# Patient Record
Sex: MALE | Employment: UNEMPLOYED | URBAN - METROPOLITAN AREA
[De-identification: names, ages, dates, MRNs, and addresses within clinical notes are randomized per-mention and may not be internally consistent; named-entity substitution may affect disease eponyms.]

---

## 2018-04-22 ENCOUNTER — HOSPITAL ENCOUNTER (EMERGENCY)
Age: 23
Discharge: HOME | End: 2018-04-22
Attending: EMERGENCY MEDICINE
Payer: SELF-PAY

## 2018-04-22 VITALS
SYSTOLIC BLOOD PRESSURE: 128 MMHG | RESPIRATION RATE: 18 BRPM | HEIGHT: 67 IN | BODY MASS INDEX: 23.54 KG/M2 | OXYGEN SATURATION: 100 % | DIASTOLIC BLOOD PRESSURE: 80 MMHG | HEART RATE: 76 BPM | WEIGHT: 150 LBS | TEMPERATURE: 98.2 F

## 2018-04-22 DIAGNOSIS — R07.0 THROAT PAIN IN ADULT: Primary | ICD-10-CM

## 2018-04-22 PROCEDURE — 74011000250 HC RX REV CODE- 250: Performed by: EMERGENCY MEDICINE

## 2018-04-22 PROCEDURE — 74011250637 HC RX REV CODE- 250/637: Performed by: EMERGENCY MEDICINE

## 2018-04-22 PROCEDURE — 99283 EMERGENCY DEPT VISIT LOW MDM: CPT

## 2018-04-22 RX ORDER — ACETAMINOPHEN 500 MG
1000 TABLET ORAL
Status: COMPLETED | OUTPATIENT
Start: 2018-04-22 | End: 2018-04-22

## 2018-04-22 RX ORDER — ACETAMINOPHEN 325 MG/1
650 TABLET ORAL
Qty: 20 TAB | Refills: 0 | Status: SHIPPED | OUTPATIENT
Start: 2018-04-22

## 2018-04-22 RX ORDER — LIDOCAINE HYDROCHLORIDE 20 MG/ML
5 INJECTION, SOLUTION INFILTRATION; PERINEURAL ONCE
Status: COMPLETED | OUTPATIENT
Start: 2018-04-22 | End: 2018-04-22

## 2018-04-22 RX ADMIN — LIDOCAINE HYDROCHLORIDE 100 MG: 20 INJECTION, SOLUTION INFILTRATION; PERINEURAL at 13:25

## 2018-04-22 RX ADMIN — ACETAMINOPHEN 1000 MG: 500 TABLET, FILM COATED ORAL at 13:25

## 2018-04-22 NOTE — ED PROVIDER NOTES
EMERGENCY DEPARTMENT HISTORY AND PHYSICAL EXAM    1:10 PM      Date: 4/22/2018  Patient Name: Tory James    History of Presenting Illness     Chief Complaint   Patient presents with    Sore Throat    Other         History Provided By: Patient    Chief Complaint: sore throat   Duration:  1 hour  Timing:  Acute  Location: right side of throat, inside the throat  Quality: Stabbing and throbbing, poking per pt  Severity: Moderate  Modifying Factors: not triggered by eating  Associated Symptoms: denies any other associated signs or symptoms      Additional History (Context): Tory James, a 26 y.o. Male, marijuana smoker (last used this morning), social alcohol drinker with no PMHx, SHx, or allergies, presents to the ED with 1 hour of acute, moderate severity, right sided sore throat that is described as a stabbing, throbbing and poking sensation by the pt. Sx were initially associated with throat swelling but pt explains that his throat swelling has resolved and is no longer a complaint at the time of evaluation. Pt does not have h/o the same and denies arm weakness, abx intake, recent travels, cp, sob, and abd pain. No other complaints are reported at this time. PCP: Angelo Caro MD    Current Outpatient Prescriptions   Medication Sig Dispense Refill    promethazine (PHENERGAN) 25 mg tablet Take 1 Tab by mouth every twelve (12) hours as needed. 12 Tab 0    fluticasone (FLONASE) 50 mcg/actuation nasal spray 2 Sprays by Both Nostrils route daily. 1 spray in each nostril. 1 Bottle 0    cetirizine (ZYRTEC) 10 mg tablet Take one tablets by mouth daily for 7 days and then as needed after that. Restart forseven days if congestion recurs. 15 Tab 0    albuterol (PROVENTIL, VENTOLIN) 90 mcg/actuation inhaler Take 1-2 Puffs by inhalation every four (4) hours as needed for Wheezing.  17 g 0       Past History     Past Medical History:  Past Medical History:   Diagnosis Date    HX OTHER MEDICAL seasonal allergies       Past Surgical History:  History reviewed. No pertinent surgical history. Family History:  History reviewed. No pertinent family history. Social History:  Social History   Substance Use Topics    Smoking status: Never Smoker    Smokeless tobacco: Never Used    Alcohol use Yes      Comment: socially       Allergies:  No Known Allergies      Review of Systems       Review of Systems   Constitutional: Negative for chills and fever. HENT: Positive for sore throat (right side of throat, \"poking\" throbbing, stabbing sensation ). Negative for congestion and ear pain. Eyes: Negative for pain and visual disturbance. Respiratory: Negative for cough and shortness of breath. Cardiovascular: Negative for chest pain and palpitations. Gastrointestinal: Negative for abdominal pain, diarrhea, nausea and vomiting. Genitourinary: Negative for flank pain. Musculoskeletal: Negative for back pain and neck pain. Neurological: Negative for syncope and headaches. Psychiatric/Behavioral: Negative for agitation. The patient is not nervous/anxious. All other systems reviewed and are negative. Physical Exam     Visit Vitals    /80 (BP 1 Location: Right arm, BP Patient Position: At rest)    Pulse 76    Temp 98.2 °F (36.8 °C)    Resp 18    Ht 5' 7\" (1.702 m)    Wt 68 kg (150 lb)    SpO2 100%    BMI 23.49 kg/m2       Physical Exam   Constitutional: He is oriented to person, place, and time. He appears well-developed and well-nourished. HENT:   Head: Normocephalic and atraumatic. Mouth/Throat: Oropharynx is clear and moist.   tonsils normal, no tenderness on neck,   uvula midline. no stridor in the neck. Eyes: Pupils are equal, round, and reactive to light. No scleral icterus. Neck: Neck supple. No tracheal deviation present. Cardiovascular: Regular rhythm. No murmur heard. Pulmonary/Chest: Effort normal and breath sounds normal. No respiratory distress. Abdominal: Soft. There is no tenderness. Musculoskeletal: Normal range of motion. He exhibits no deformity. Lymphadenopathy:     He has no cervical adenopathy. Neurological: He is alert and oriented to person, place, and time. No gross neuro deficit   Skin: Skin is warm and dry. No rash noted. He is not diaphoretic. Psychiatric: He has a normal mood and affect. Nursing note and vitals reviewed. Medical Decision Making   I am the first provider for this patient. I reviewed the vital signs, available nursing notes, past medical history, past surgical history, family history and social history. Vital Signs-Reviewed the patient's vital signs. Pulse Oximetry Analysis -  100% on room air (Interpretation) Normal     Records Reviewed: Nursing Notes and Old Medical Records (Time of Review: 1:10 PM)    ED Course: Progress Notes, Reevaluation, and Consults:    Provider Notes (Medical Decision Making):     Pt presents with irritation in throat, started today, no specific relationship to eating, no fever. Smoked marijuana this morning. PE without acute pathology, will image hypopharynx with gleidoscope, with pts permission, and nebulize lidocaine. This has been discussed with the pt    1:57 PM  Was able to evaluate the hypopharynx and area near the glottis. No foreign body was appreciated. Patient tolerated the procedure well with lidocaine 2% epiglottis. 5 mL was required to use.   hypopharynx was without edema, foreign body or erythema. Imaging of the hypopharynx showed no foreign body, no edema of epiglottis, no foreign body near the epiglottic area. Diagnosis     Clinical Impression:   1.  Throat pain in adult        Disposition: Discharged    Follow-up Information     Follow up With Details Comments [address] Call in 1 day Follow Up From Emergency Department [address]  [phone]           Patient's Medications   Start Taking    No medications on file   Continue Taking    ALBUTEROL (PROVENTIL, VENTOLIN) 90 MCG/ACTUATION INHALER    Take 1-2 Puffs by inhalation every four (4) hours as needed for Wheezing. CETIRIZINE (ZYRTEC) 10 MG TABLET    Take one tablets by mouth daily for 7 days and then as needed after that. Restart forseven days if congestion recurs. FLUTICASONE (FLONASE) 50 MCG/ACTUATION NASAL SPRAY    2 Sprays by Both Nostrils route daily. 1 spray in each nostril. PROMETHAZINE (PHENERGAN) 25 MG TABLET    Take 1 Tab by mouth every twelve (12) hours as needed. These Medications have changed    No medications on file   Stop Taking    No medications on file     _______________________________    Attestations:  Stephen Godinez acting as a scribe for and in the presence of Janice Alanis MD      April 22, 2018 at 1:10 PM       Provider Attestation:      I personally performed the services described in the documentation, reviewed the documentation, as recorded by the scribe in my presence, and it accurately and completely records my words and actions.  April 22, 2018 at 1:10 PM - Janice Alanis MD    _______________________________

## 2018-04-22 NOTE — DISCHARGE INSTRUCTIONS
Sore Throat: Care Instructions  Your Care Instructions    Infection by bacteria or a virus causes most sore throats. Cigarette smoke, dry air, air pollution, allergies, and yelling can also cause a sore throat. Sore throats can be painful and annoying. Fortunately, most sore throats go away on their own. If you have a bacterial infection, your doctor may prescribe antibiotics. Follow-up care is a key part of your treatment and safety. Be sure to make and go to all appointments, and call your doctor if you are having problems. It's also a good idea to know your test results and keep a list of the medicines you take. How can you care for yourself at home? · If your doctor prescribed antibiotics, take them as directed. Do not stop taking them just because you feel better. You need to take the full course of antibiotics. · Gargle with warm salt water once an hour to help reduce swelling and relieve discomfort. Use 1 teaspoon of salt mixed in 1 cup of warm water. · Take an over-the-counter pain medicine, such as acetaminophen (Tylenol), ibuprofen (Advil, Motrin), or naproxen (Aleve). Read and follow all instructions on the label. · Be careful when taking over-the-counter cold or flu medicines and Tylenol at the same time. Many of these medicines have acetaminophen, which is Tylenol. Read the labels to make sure that you are not taking more than the recommended dose. Too much acetaminophen (Tylenol) can be harmful. · Drink plenty of fluids. Fluids may help soothe an irritated throat. Hot fluids, such as tea or soup, may help decrease throat pain. · Use over-the-counter throat lozenges to soothe pain. Regular cough drops or hard candy may also help. These should not be given to young children because of the risk of choking. · Do not smoke or allow others to smoke around you. If you need help quitting, talk to your doctor about stop-smoking programs and medicines.  These can increase your chances of quitting for good. · Use a vaporizer or humidifier to add moisture to your bedroom. Follow the directions for cleaning the machine. When should you call for help? Call your doctor now or seek immediate medical care if:  ? · You have new or worse trouble swallowing. ? · Your sore throat gets much worse on one side. ? Watch closely for changes in your health, and be sure to contact your doctor if you do not get better as expected. Where can you learn more? Go to http://jessenia-uday.info/. Enter [phone] in the search box to learn more about \"Sore Throat: Care Instructions. \"  Current as of: May 12, 2017  Content Version: 11.4  © 3811-8387 [employer]. Care instructions adapted under license by [employer] (which disclaims liability or warranty for this information). If you have questions about a medical condition or this instruction, always ask your healthcare professional. [address] any warranty or liability for your use of this information.

## 2018-04-22 NOTE — ED TRIAGE NOTES
Pt c/o \"i feel like my throat is swelling. \" denies eating, taking medicine today, or enviromental changes \"I live with my grandmother. \"

## 2018-04-22 NOTE — ED NOTES
pt with c/o \"heartbeat in my throat\" explained anatomy to pt- pt stating that he is going to ask his PCP for a CXR \"to make sure things are all ok\" , pt without any c/o pain or difficulty swallowing, pt had used Marijuana today and feels \"heartbeat in throat\" no pain or stabbing, just \"heartbeating\".  Explained carotid arteries and flow of blood to brain and pt stating \"naw its like a heart beating in the sides of my throat\"

## 2018-06-08 ENCOUNTER — APPOINTMENT (OUTPATIENT)
Dept: CT IMAGING | Age: 23
End: 2018-06-08
Attending: EMERGENCY MEDICINE
Payer: SELF-PAY

## 2018-06-08 ENCOUNTER — APPOINTMENT (OUTPATIENT)
Dept: GENERAL RADIOLOGY | Age: 23
End: 2018-06-08
Attending: EMERGENCY MEDICINE
Payer: SELF-PAY

## 2018-06-08 ENCOUNTER — HOSPITAL ENCOUNTER (EMERGENCY)
Age: 23
Discharge: HOME | End: 2018-06-08
Attending: EMERGENCY MEDICINE
Payer: SELF-PAY

## 2018-06-08 VITALS
HEART RATE: 65 BPM | WEIGHT: 150 LBS | HEIGHT: 71 IN | DIASTOLIC BLOOD PRESSURE: 58 MMHG | SYSTOLIC BLOOD PRESSURE: 122 MMHG | RESPIRATION RATE: 18 BRPM | TEMPERATURE: 98.2 F | OXYGEN SATURATION: 100 % | BODY MASS INDEX: 21 KG/M2

## 2018-06-08 DIAGNOSIS — R68.89 SENSATION OF SWOLLEN THROAT: Primary | ICD-10-CM

## 2018-06-08 LAB
ANION GAP BLD CALC-SCNC: 16 MMOL/L (ref 10–20)
BASOPHILS # BLD: 0 K/UL (ref 0–0.06)
BASOPHILS NFR BLD: 0 % (ref 0–2)
BUN BLD-MCNC: 13 MG/DL (ref 7–18)
CA-I BLD-MCNC: 1.24 MMOL/L (ref 1.12–1.32)
CHLORIDE BLD-SCNC: 99 MMOL/L (ref 100–108)
CO2 BLD-SCNC: 26 MMOL/L (ref 19–24)
CREAT UR-MCNC: 0.8 MG/DL (ref 0.6–1.3)
DIFFERENTIAL METHOD BLD: ABNORMAL
EOSINOPHIL # BLD: 0.2 K/UL (ref 0–0.4)
EOSINOPHIL NFR BLD: 4 % (ref 0–5)
ERYTHROCYTE [DISTWIDTH] IN BLOOD BY AUTOMATED COUNT: 11.9 % (ref 11.6–14.5)
GLUCOSE BLD STRIP.AUTO-MCNC: 88 MG/DL (ref 74–106)
HCT VFR BLD AUTO: 47.9 % (ref 36–48)
HCT VFR BLD CALC: 50 % (ref 36–49)
HGB BLD-MCNC: 17 G/DL (ref 12–16)
HGB BLD-MCNC: 17.4 G/DL (ref 13–16)
LYMPHOCYTES # BLD: 2.1 K/UL (ref 0.9–3.6)
LYMPHOCYTES NFR BLD: 34 % (ref 21–52)
MCH RBC QN AUTO: 33.8 PG (ref 24–34)
MCHC RBC AUTO-ENTMCNC: 36.3 G/DL (ref 31–37)
MCV RBC AUTO: 93 FL (ref 74–97)
MONOCYTES # BLD: 0.6 K/UL (ref 0.05–1.2)
MONOCYTES NFR BLD: 10 % (ref 3–10)
NEUTS SEG # BLD: 3.3 K/UL (ref 1.8–8)
NEUTS SEG NFR BLD: 52 % (ref 40–73)
PLATELET # BLD AUTO: 155 K/UL (ref 135–420)
PMV BLD AUTO: 10.7 FL (ref 9.2–11.8)
POTASSIUM BLD-SCNC: 3.8 MMOL/L (ref 3.5–5.5)
RBC # BLD AUTO: 5.15 M/UL (ref 4.7–5.5)
SODIUM BLD-SCNC: 135 MMOL/L (ref 136–145)
WBC # BLD AUTO: 6.3 K/UL (ref 4.6–13.2)

## 2018-06-08 PROCEDURE — 99285 EMERGENCY DEPT VISIT HI MDM: CPT

## 2018-06-08 PROCEDURE — 74011636320 HC RX REV CODE- 636/320: Performed by: EMERGENCY MEDICINE

## 2018-06-08 PROCEDURE — 85025 COMPLETE CBC W/AUTO DIFF WBC: CPT | Performed by: EMERGENCY MEDICINE

## 2018-06-08 PROCEDURE — 70360 X-RAY EXAM OF NECK: CPT

## 2018-06-08 PROCEDURE — 80047 BASIC METABLC PNL IONIZED CA: CPT

## 2018-06-08 PROCEDURE — 70491 CT SOFT TISSUE NECK W/DYE: CPT

## 2018-06-08 RX ADMIN — IOPAMIDOL 90 ML: 612 INJECTION, SOLUTION INTRAVENOUS at 05:29

## 2018-06-08 NOTE — ED PROVIDER NOTES
HPI Comments: Padilla Odom is a 26 y.o. Male with c/o sensation of throat closing tonight but has been going off/on for last month. No cough, stridor, diff swallowing, fcs, facial swelling. Vomited after taking the steroid he was previously prescribed. No neck swelling. Sx worse with lying down. No syncope    The history is provided by the patient. Past Medical History:   Diagnosis Date    Asthma     HX OTHER MEDICAL seasonal allergies       History reviewed. No pertinent surgical history. History reviewed. No pertinent family history. Social History     Social History    Marital status: SINGLE     Spouse name: N/A    Number of children: N/A    Years of education: N/A     Occupational History    Not on file. Social History Main Topics    Smoking status: Former Smoker    Smokeless tobacco: Never Used    Alcohol use Yes      Comment: socially    Drug use: Yes     Special: Heroin, Marijuana    Sexual activity: Yes     Partners: Female     Other Topics Concern    Not on file     Social History Narrative         ALLERGIES: Review of patient's allergies indicates no known allergies. Review of Systems   Constitutional: Negative for fever. HENT: Negative for congestion, drooling, facial swelling, sneezing, sore throat, tinnitus, trouble swallowing and voice change. Eyes: Negative for visual disturbance. Respiratory: Negative for cough, shortness of breath and wheezing. Cardiovascular: Negative for chest pain and leg swelling. Gastrointestinal: Negative for abdominal pain. Musculoskeletal: Negative for gait problem. Skin: Negative for rash. Allergic/Immunologic: Negative for immunocompromised state. Neurological: Negative for syncope. Psychiatric/Behavioral: Positive for sleep disturbance.        Vitals:    06/08/18 0322   BP: 128/90   Pulse: 65   Resp: 20   Temp: 98.2 °F (36.8 °C)   SpO2: 100%   Weight: 68 kg (150 lb)   Height: 5' 11\" (1.803 m)            Physical Exam   Constitutional: He is oriented to person, place, and time. Non-toxic appearance. He does not appear ill. No distress. HENT:   Head: Normocephalic and atraumatic. Right Ear: External ear normal.   Left Ear: External ear normal.   Nose: Nose normal.   Mouth/Throat: Uvula is midline, oropharynx is clear and moist and mucous membranes are normal. No oral lesions. No trismus in the jaw. No dental abscesses or uvula swelling. No oropharyngeal exudate, posterior oropharyngeal edema, posterior oropharyngeal erythema or tonsillar abscesses. Eyes: Conjunctivae are normal.   Neck: Normal range of motion. Neck supple. Cardiovascular: Normal rate, regular rhythm, normal heart sounds and intact distal pulses. Pulmonary/Chest: Effort normal and breath sounds normal. No stridor. No respiratory distress. Abdominal: Soft. There is no tenderness. Musculoskeletal: Normal range of motion. He exhibits no edema. Lymphadenopathy:     He has no cervical adenopathy. Neurological: He is alert and oriented to person, place, and time. Skin: Skin is warm and dry. He is not diaphoretic. Psychiatric: His behavior is normal.   Nursing note and vitals reviewed.        [facility]      ED Course       Procedures    Vitals:  Patient Vitals for the past 12 hrs:   Temp Pulse Resp BP SpO2   06/08/18 0322 98.2 °F (36.8 °C) 65 20 128/90 100 %       Medications ordered:   Medications   iopamidol (ISOVUE 300) 61 % contrast injection 100 mL (90 mL IntraVENous Given 6/8/18 0529)         Lab findings:  Recent Results (from the past 12 hour(s))   CBC WITH AUTOMATED DIFF    Collection Time: 06/08/18  4:06 AM   Result Value Ref Range    WBC 6.3 4.6 - 13.2 K/uL    RBC 5.15 4.70 - 5.50 M/uL    HGB 17.4 (H) 13.0 - 16.0 g/dL    HCT 47.9 36.0 - 48.0 %    MCV 93.0 74.0 - 97.0 FL    MCH 33.8 24.0 - 34.0 PG    MCHC 36.3 31.0 - 37.0 g/dL    RDW 11.9 11.6 - 14.5 %    PLATELET 596 141 - 241 K/uL    MPV 10.7 9.2 - 11.8 FL    NEUTROPHILS 52 40 - 73 % LYMPHOCYTES 34 21 - 52 %    MONOCYTES 10 3 - 10 %    EOSINOPHILS 4 0 - 5 %    BASOPHILS 0 0 - 2 %    ABS. NEUTROPHILS 3.3 1.8 - 8.0 K/UL    ABS. LYMPHOCYTES 2.1 0.9 - 3.6 K/UL    ABS. MONOCYTES 0.6 0.05 - 1.2 K/UL    ABS. EOSINOPHILS 0.2 0.0 - 0.4 K/UL    ABS. BASOPHILS 0.0 0.0 - 0.06 K/UL    DF AUTOMATED     POC CHEM8    Collection Time: 06/08/18  4:56 AM   Result Value Ref Range    CO2, POC 26 (H) 19 - 24 MMOL/L    Glucose, POC 88 74 - 106 MG/DL    BUN, POC 13 7 - 18 MG/DL    Creatinine, POC 0.8 0.6 - 1.3 MG/DL    GFRAA, POC >60 >60 ml/min/1.73m2    GFRNA, POC >60 >60 ml/min/1.73m2    Sodium,  (L) 136 - 145 MMOL/L    Potassium, POC 3.8 3.5 - 5.5 MMOL/L    Calcium, ionized (POC) 1.24 1.12 - 1.32 mmol/L    Chloride, POC 99 (L) 100 - 108 MMOL/L    Anion gap, POC 16 10 - 20      Hematocrit, POC 50 (H) 36 - 49 %    Hemoglobin, POC 17.0 (H) 12 - 16 G/DL       EKG interpretation by ED Physician:      X-Ray, CT or other radiology findings or impressions:  XR NECK SOFT TISSUE    (Results Pending)   CT NECK SOFT TISSUE W CONT    (Results Pending)       Progress notes, Consult notes or additional Procedure notes:   Nothing acute on ct  Ct ordered for possible epiglottis swelling on xray per my intepr  Doubt need for other work up, ent eval, transfer tonight. No evidence of airway obstruction  I have discussed with patient and/or family/sig other the results, interpretation of any imaging if performed, suspected diagnosis and treatment plan to include instructions regarding the diagnoses listed to which understanding was expressed with all questions answered      Reevaluation of patient:   stable    Disposition:  Diagnosis:   1.  Sensation of swollen throat        Disposition: home      Follow-up Information     Follow up With Details Comments Contact Info    [facility] Department of Otolaryngology Schedule an appointment as soon as possible for a visit  [address] [place] [address]  [phone] Patient's Medications   Start Taking    No medications on file   Continue Taking    ACETAMINOPHEN (TYLENOL) 325 MG TABLET    Take 2 Tabs by mouth every four (4) hours as needed for Pain. METHYLPREDNISOLONE (MEDROL, LELA,) 4 MG TABLET    Use as directed.    These Medications have changed    No medications on file   Stop Taking    No medications on file

## 2018-06-08 NOTE — ED TRIAGE NOTES
Patient complains of throat feeling swollen. Patient states he was seen at [facility] and given a steroid which helped but then felt like it got worse. Patient is 100 percent on RA. Patient is able to swallow all secretions and is in no apparent distress at this time.

## 2019-02-28 ENCOUNTER — HOSPITAL ENCOUNTER (OUTPATIENT)
Dept: LAB | Age: 24
Discharge: HOME | End: 2019-02-28
Payer: MEDICAID

## 2019-02-28 ENCOUNTER — OFFICE VISIT (OUTPATIENT)
Dept: FAMILY MEDICINE CLINIC | Age: 24
End: 2019-02-28

## 2019-02-28 VITALS
WEIGHT: 162 LBS | RESPIRATION RATE: 16 BRPM | DIASTOLIC BLOOD PRESSURE: 81 MMHG | BODY MASS INDEX: 23.19 KG/M2 | HEART RATE: 76 BPM | TEMPERATURE: 98.6 F | HEIGHT: 70 IN | OXYGEN SATURATION: 98 % | SYSTOLIC BLOOD PRESSURE: 133 MMHG

## 2019-02-28 DIAGNOSIS — J39.2 THROAT MASS: ICD-10-CM

## 2019-02-28 DIAGNOSIS — J39.2 THROAT MASS: Primary | ICD-10-CM

## 2019-02-28 PROBLEM — J45.909 ASTHMA: Status: RESOLVED | Noted: 2019-02-28 | Resolved: 2019-02-28

## 2019-02-28 LAB
ALBUMIN SERPL-MCNC: 4.2 G/DL (ref 3.4–5)
ALBUMIN/GLOB SERPL: 1.3 {RATIO} (ref 0.8–1.7)
ALP SERPL-CCNC: 134 U/L (ref 45–117)
ALT SERPL-CCNC: 19 U/L (ref 16–61)
ANION GAP SERPL CALC-SCNC: 9 MMOL/L (ref 3–18)
AST SERPL-CCNC: 11 U/L (ref 15–37)
BILIRUB SERPL-MCNC: 0.6 MG/DL (ref 0.2–1)
BUN SERPL-MCNC: 12 MG/DL (ref 7–18)
BUN/CREAT SERPL: 14 (ref 12–20)
CALCIUM SERPL-MCNC: 8.6 MG/DL (ref 8.5–10.1)
CHLORIDE SERPL-SCNC: 106 MMOL/L (ref 100–108)
CO2 SERPL-SCNC: 25 MMOL/L (ref 21–32)
CREAT SERPL-MCNC: 0.88 MG/DL (ref 0.6–1.3)
ERYTHROCYTE [DISTWIDTH] IN BLOOD BY AUTOMATED COUNT: 12.2 % (ref 11.6–14.5)
GLOBULIN SER CALC-MCNC: 3.3 G/DL (ref 2–4)
GLUCOSE SERPL-MCNC: 85 MG/DL (ref 74–99)
HCT VFR BLD AUTO: 48.7 % (ref 36–48)
HGB BLD-MCNC: 16.7 G/DL (ref 13–16)
MCH RBC QN AUTO: 31.2 PG (ref 24–34)
MCHC RBC AUTO-ENTMCNC: 34.3 G/DL (ref 31–37)
MCV RBC AUTO: 91 FL (ref 74–97)
PLATELET # BLD AUTO: 247 K/UL (ref 135–420)
PMV BLD AUTO: 10.9 FL (ref 9.2–11.8)
POTASSIUM SERPL-SCNC: 4 MMOL/L (ref 3.5–5.5)
PROT SERPL-MCNC: 7.5 G/DL (ref 6.4–8.2)
RBC # BLD AUTO: 5.35 M/UL (ref 4.7–5.5)
SODIUM SERPL-SCNC: 140 MMOL/L (ref 136–145)
T4 FREE SERPL-MCNC: 1 NG/DL (ref 0.7–1.5)
TSH SERPL DL<=0.05 MIU/L-ACNC: 0.91 UIU/ML (ref 0.36–3.74)
WBC # BLD AUTO: 5.9 K/UL (ref 4.6–13.2)

## 2019-02-28 PROCEDURE — 86664 EPSTEIN-BARR NUCLEAR ANTIGEN: CPT

## 2019-02-28 PROCEDURE — 85027 COMPLETE CBC AUTOMATED: CPT

## 2019-02-28 PROCEDURE — 84443 ASSAY THYROID STIM HORMONE: CPT

## 2019-02-28 PROCEDURE — 84439 ASSAY OF FREE THYROXINE: CPT

## 2019-02-28 PROCEDURE — 36415 COLL VENOUS BLD VENIPUNCTURE: CPT

## 2019-02-28 PROCEDURE — 80053 COMPREHEN METABOLIC PANEL: CPT

## 2019-02-28 PROCEDURE — 86308 HETEROPHILE ANTIBODY SCREEN: CPT

## 2019-02-28 NOTE — PATIENT INSTRUCTIONS
Thyroid Hormone Tests: About This Test  What is it? Thyroid hormone tests are blood tests that check how well your thyroid gland is working. The thyroid gland is a butterfly-shaped gland that lies in front of your windpipe (trachea), just below your voice box (larynx). The thyroid gland makes hormones that control the way your body uses energy (metabolism). This test will give your doctor information about your thyroid hormone levels. You may have hyperthyroidism when your thyroid makes too much thyroid hormone. You may have hypothyroidism when your body does not make enough thyroid hormone. Why is this test done? Thyroid hormone tests are done to:  · Find the cause of an abnormal thyroid-stimulating hormone (TSH) test, which is a blood test that checks for thyroid gland problems. · Check how well treatment for thyroid disease is working. · Test a  to find out if his or her thyroid gland is working as it should. How can you prepare for the test?  Talk to your doctor about all your health conditions before the test. For example, tell your doctor about all medicines you take. If you are taking thyroid medicines, tell your doctor when you took your last dose. You may need to stop taking thyroid medicines for a short time before having this test.  What happens during the test?  Blood test  A health professional will take a sample of your blood. Heel stick  A 's heel is pricked with a sharp, short needle (lancet). Then, several drops of blood are collected for the test. Your child may have a tiny bruise where his or her heel was pricked. What happens after the test?  You can go back to your usual activities right away. When should you call for help? Watch closely for changes in your health, and be sure to contact your doctor if you have any questions about this test.  Follow-up care is a key part of your treatment and safety.  Be sure to make and go to all appointments, and call your doctor if you are having problems. It's also a good idea to keep a list of the medicines you take. Ask your doctor when you can expect to have your test results. Where can you learn more? Go to http://jessenia-uday.info/. Enter H865 in the search box to learn more about \"Thyroid Hormone Tests: About This Test.\"  Current as of: March 14, 2018  Content Version: 11.9  © 8400-7804 [employer]. Care instructions adapted under license by [employer] (which disclaims liability or warranty for this information). If you have questions about a medical condition or this instruction, always ask your healthcare professional. [address] any warranty or liability for your use of this information.

## 2019-02-28 NOTE — PROGRESS NOTES
[address]               [phone]          Subjective:   Neeru Mccord is a 22 y.o. male here for an acute visit for    Chief Complaint   Patient presents with   [facility]     One years pain and tightness to throat area        HPI    Onset April 2018   Location Left side of throat   Duration constant   Characteristics Smoking marijuana, shortly after felt something swell up in his throat, went ED, at that time they did a upper endoscopy, and said they did no see anything, last month had another endoscope which was negative for findings, been to ENT, had CT scan, X-ray. At first could barely swallow now is better   Aggrevating nothing   Relieving nothing   Treatment nothing   Pertinent PMH No longer smokes marijuana    Pertinent negatives Fever, n/v, difficulty swallowing, difficulty breathing     ROS  As above, the rest are negative   ROS           Patient Active Problem List   Diagnosis Code   (none) - all problems resolved or deleted       No Known Allergies  Family History   Problem Relation Age of Onset    No Known Problems Mother     Kidney Disease Father        Objective:     Vitals:    02/28/19 1439   BP: 133/81   Pulse: 76   Resp: 16   Temp: 98.6 °F (37 °C)   SpO2: 98%   Weight: 162 lb (73.5 kg)   Height: 5' 10\" (1.778 m)     Body mass index is 23.24 kg/m². Physical Exam  Physical Exam   Constitutional: He is oriented to person, place, and time and well-developed, well-nourished, and in no distress. Vital signs are normal.   Neck: Normal range of motion and full passive range of motion without pain. Neck supple. No JVD present. Cardiovascular: Normal rate, regular rhythm, normal heart sounds and intact distal pulses. Exam reveals no gallop and no friction rub. No murmur heard. Pulmonary/Chest: Effort normal and breath sounds normal.   Neurological: He is alert and oriented to person, place, and time.    Skin: Skin is warm and dry.   Psychiatric: Memory, affect and judgment normal.   Nursing note and vitals reviewed. Labwork and Ancillary Studies:    CBC w/Diff  No results found for: WBC, WBCLT, HGB, HGBP, PLT, HGBEXT, PLTEXT, HGBEXT, PLTEXT      Basic Metabolic Profile  No results found for: NA, K, CL, CO2, AGAP, GLU, BUN, CREA, BUCR, GFRAA, GFRNA, CA, GFRAA     Cholesterol  No results found for: CHOL, CHOLX, CHLST, CHOLV, HDL, LDL, LDLC, DLDLP, TGLX, TRIGL, TRIGP, CHHD, CHHDX       Assessment/Plan:    Diagnoses and all orders for this visit:    1. Throat mass  -     MONO SCREEN W/ REFLX EBV; Future  -     CBC W/O DIFF; Future  -     METABOLIC PANEL, COMPREHENSIVE; Future  -     TSH 3RD GENERATION; Future  -     T4, FREE; Future    He was diagnosed with mononucleosis at a past visit to the ED. Recheck today along with thyroid studies and routine labs. He has had prior imaging which were all negative. Health Maintenance:   Health Maintenance   Topic Date Due    DTaP/Tdap/Td series (1 - Tdap) 01/28/2017    Influenza Age 5 to Adult  08/01/2018       I have discussed the diagnosis with the patient and the intended plan as seen in the above orders. The patient has received an After-Visit Summary and questions were answered concerning future plans. Return to clinic if sxs persist, to ER if sxs worsen    Patient verbalized understanding to above instructions. AVS printed and given to pt. Follow-up Disposition:  Return in about 1 week (around 3/7/2019) for cpe/ lab review/ 30min. Penelope Kim, [facility]  [address] [facility]   [address] [facility] [address]  [id number]

## 2019-02-28 NOTE — PROGRESS NOTES
1. Have you been to the ER, urgent care clinic since your last visit? Hospitalized since your last visit? No    2. Have you seen or consulted any other health care providers outside of the [address] [place] [address] since your last visit? Include any pap smears or colon screening.  No     Chief Complaint   Patient presents with   [address]     One years pain/ tightness to throat area        Visit Vitals  /81 (BP 1 Location: Left arm, BP Patient Position: At rest)   Pulse 76   Temp 98.6 °F (37 °C)   Resp 16   Ht 5' 10\" (1.778 m)   Wt 162 lb (73.5 kg)   SpO2 98%   BMI 23.24 kg/m²

## 2019-03-02 LAB
EBV EA IGG SER-ACNC: <9 U/ML (ref 0–8.9)
EBV NA IGG SER-ACNC: >600 U/ML (ref 0–17.9)
EBV VCA IGG SER-ACNC: 61.8 U/ML (ref 0–17.9)
EBV VCA IGM SER-ACNC: <36 U/ML (ref 0–35.9)
HETEROPH AB SER QL: NEGATIVE
INTERPRETATION, 169995: ABNORMAL

## 2019-03-07 ENCOUNTER — OFFICE VISIT (OUTPATIENT)
Dept: FAMILY MEDICINE CLINIC | Age: 24
End: 2019-03-07

## 2019-03-07 VITALS
TEMPERATURE: 97.6 F | WEIGHT: 162 LBS | HEART RATE: 65 BPM | BODY MASS INDEX: 23.19 KG/M2 | HEIGHT: 70 IN | SYSTOLIC BLOOD PRESSURE: 128 MMHG | RESPIRATION RATE: 16 BRPM | DIASTOLIC BLOOD PRESSURE: 58 MMHG

## 2019-03-07 DIAGNOSIS — J39.2 THROAT MASS: Primary | ICD-10-CM

## 2019-03-07 NOTE — PROGRESS NOTES
1. Have you been to the ER, urgent care clinic since your last visit? Hospitalized since your last visit? No    2. Have you seen or consulted any other health care providers outside of the [address] [place] [address] since your last visit? Include any pap smears or colon screening.  No       Chief Complaint   Patient presents with    Labs     Visit Vitals  /58 (BP 1 Location: Left arm, BP Patient Position: At rest)   Pulse 65   Temp 97.6 °F (36.4 °C) (Oral)   Resp 16   Ht 5' 10\" (1.778 m)   Wt 162 lb (73.5 kg)   BMI 23.24 kg/m²

## 2019-03-07 NOTE — PATIENT INSTRUCTIONS
CT Scan of the Neck: About This Test  What is it? A CT (computed tomography) scan uses X-rays to make detailed pictures of your body and structures inside your body. A CT scan of the neck can give your doctor information about your neck, throat, and tonsils and other structures in the neck area. Why is this test done? A CT scan of the neck can help find problems such as infection or tumors. How can you prepare for the test?  Talk to your doctor about all your health conditions before the test. For example, tell your doctor if:  · You are or might be pregnant. · You are allergic to any medicines. · You have diabetes. · You take metformin. · You are breastfeeding. · You get nervous in confined spaces. You may need medicine to help you relax. What happens before the test?  · You may have to take off jewelry. · You will take off all or most of your clothes and change into a gown. If you do leave some clothes on, make sure you take everything out of your pockets. · You may have contrast material (dye) put into your arm through a tube called an IV. Contrast material helps doctors see specific organs, blood vessels, and most tumors. What happens during the test?  · You will lie on a table that is attached to the CT scanner. · The table will slide into the round opening of the scanner. The table will move during the scan. The scanner moves inside the doughnut-shaped casing around your body. · You will be asked to hold still during the scan. You may be asked to hold your breath for short periods. · You may be alone in the scanning room, but a technologist will be watching you through a window and talking with you during the test.  What else should you know about the test?  · A CT scan does not hurt. · If a dye is used, you may feel a quick sting or pinch when the IV is started. The dye may make you feel warm and flushed and give you a metallic taste in your mouth.  Some people feel sick to their stomach or get a headache. · If you breastfeed and are concerned about whether the dye used in this test is safe, talk to your doctor. Most experts believe that very little dye passes into breast milk and even less is passed on to the baby. But if you prefer, you can store some of your breast milk ahead of time and use it for a day or two after the test.  · The dose of radiation from a CT scanner may be higher than that from other X-ray tests. If you are concerned about the radiation risk, talk to your doctor. How long does the test take? · The test will take about 30 to 60 minutes. Most of this time is spent getting ready for the scan. The actual test only takes a few minutes. What happens after the test?  · You will probably be able to go home right away. · You can go back to your usual activities right away. · Drink plenty of fluids for 24 hours after the test if dye was used, unless your doctor tells you not to. When should you call for help? Watch closely for changes in your health, and be sure to contact your doctor if you have any problems. Follow-up care is a key part of your treatment and safety. Be sure to make and go to all appointments, and call your doctor if you are having problems. It's also a good idea to keep a list of the medicines you take. Ask your doctor when you can expect to have your test results. Where can you learn more? Go to http://jessenia-uday.info/. Enter R370 in the search box to learn more about \"CT Scan of the Neck: About This Test.\"  Current as of: June 25, 2018  Content Version: 11.9  © 7060-2035 [employer]. Care instructions adapted under license by [employer] (which disclaims liability or warranty for this information). If you have questions about a medical condition or this instruction, always ask your healthcare professional. [address] any warranty or liability for your use of this information.

## 2019-03-07 NOTE — PROGRESS NOTES
[address]               [phone]      Miguel Gonsalves is a 22 y.o. male and presents with Labs         Subjective:  Here to follow up on lab results from recent visit. Throat mass  States mass is still there and unchanged  States his throat still feels full  Reviewed labs with him: they were all negative. Next step is to get a CT scan. ROS:  As stated in HPI, otherwise negative. ROS    The problem list was updated as a part of today's visit. Patient Active Problem List   Diagnosis Code   (none) - all problems resolved or deleted       The PSH, FH were reviewed. SH:  Social History     Tobacco Use    Smoking status: Former Smoker    Smokeless tobacco: Never Used   Substance Use Topics    Alcohol use: No     Frequency: Never     Comment: former driner     Drug use: Yes     Types: Heroin, Marijuana     Comment: former smoker since year 4-2018       Medications/Allergies:  Current Outpatient Medications on File Prior to Visit   Medication Sig Dispense Refill    methylPREDNISolone (MEDROL, LELA,) 4 mg tablet Use as directed. 1 Dose Pack 0    acetaminophen (TYLENOL) 325 mg tablet Take 2 Tabs by mouth every four (4) hours as needed for Pain. 20 Tab 0     No current facility-administered medications on file prior to visit. No Known Allergies    Objective:  Visit Vitals  /58 (BP 1 Location: Left arm, BP Patient Position: At rest)   Pulse 65   Temp 97.6 °F (36.4 °C) (Oral)   Resp 16   Ht 5' 10\" (1.778 m)   Wt 162 lb (73.5 kg)   BMI 23.24 kg/m²    Body mass index is 23.24 kg/m². Physical assessment  Physical Exam  Constitutional: He is oriented to person, place, and time and well-developed, well-nourished, and in no distress. Vital signs are normal.   Neck: Normal range of motion and full passive range of motion without pain. Neck supple. No JVD present.        Cardiovascular: Normal rate, regular rhythm, normal heart sounds and intact distal pulses. Exam reveals no gallop and no friction rub. No murmur heard. Pulmonary/Chest: Effort normal and breath sounds normal.   Neurological: He is alert and oriented to person, place, and time. Skin: Skin is warm and dry. Labwork and Ancillary Studies:    CBC w/Diff  Lab Results   Component Value Date/Time    WBC 5.9 02/28/2019 03:25 PM    HGB 16.7 (H) 02/28/2019 03:25 PM    PLATELET 307 01/45/7382 03:25 PM         Basic Metabolic Profile  Lab Results   Component Value Date/Time    Sodium 140 02/28/2019 03:25 PM    Potassium 4.0 02/28/2019 03:25 PM    Chloride 106 02/28/2019 03:25 PM    CO2 25 02/28/2019 03:25 PM    Anion gap 9 02/28/2019 03:25 PM    Glucose 85 02/28/2019 03:25 PM    BUN 12 02/28/2019 03:25 PM    Creatinine 0.88 02/28/2019 03:25 PM    BUN/Creatinine ratio 14 02/28/2019 03:25 PM    GFR est AA >60 02/28/2019 03:25 PM    GFR est non-AA >60 02/28/2019 03:25 PM    Calcium 8.6 02/28/2019 03:25 PM        Cholesterol  No results found for: CHOL, CHOLX, CHLST, CHOLV, HDL, LDL, LDLC, DLDLP, TGLX, TRIGL, TRIGP, CHHD, CHHDX    Assessment/Plan:    Diagnoses and all orders for this visit:    1. Throat mass  -     CT NECK SOFT TISSUE WO CONT; Future      Greater than 50% of the time was spent in counseling and coordination of care. Health Maintenance:   Health Maintenance   Topic Date Due    DTaP/Tdap/Td series (1 - Tdap) 01/28/2017    Influenza Age 5 to Adult  Completed         I have discussed the diagnosis with the patient and the intended plan as seen in the above orders. The patient has received an After-Visit Summary and questions were answered concerning future plans. An After Visit Summary was printed and given to the patient. All diagnosis have been discussed with the patient and all of the patient's questions have been answered. Follow-up Disposition:  Return if symptoms worsen or fail to improve.     Seven [place], SULMAP-BC  [facility] [address] [facility] [place] [address]  [id number]

## 2019-04-01 ENCOUNTER — TELEPHONE (OUTPATIENT)
Dept: FAMILY MEDICINE CLINIC | Age: 24
End: 2019-04-01

## 2019-04-01 NOTE — TELEPHONE ENCOUNTER
Yolande with [facility] called in regards to a peer to peer for patient CT.  She can be reached at [phone]

## 2019-04-04 ENCOUNTER — TELEPHONE (OUTPATIENT)
Dept: FAMILY MEDICINE CLINIC | Age: 24
End: 2019-04-04

## 2019-04-05 NOTE — TELEPHONE ENCOUNTER
Second attempt at [phone] to call patient and left a voicemail message to return the phone call.

## 2019-04-11 ENCOUNTER — TELEPHONE (OUTPATIENT)
Dept: FAMILY MEDICINE CLINIC | Age: 24
End: 2019-04-11

## 2019-04-11 NOTE — TELEPHONE ENCOUNTER
3rd attempt and left a message a [phone] to call office back and phone number [phone] is currently out of service. Can we please send a letter to patient to inform huim CT scan doesn't not cover his insurance and next is ENT.      Please advise

## 2022-07-05 ENCOUNTER — NURSE TRIAGE (OUTPATIENT)
Dept: OTHER | Facility: CLINIC | Age: 27
End: 2022-07-05
